# Patient Record
Sex: MALE | Race: WHITE
[De-identification: names, ages, dates, MRNs, and addresses within clinical notes are randomized per-mention and may not be internally consistent; named-entity substitution may affect disease eponyms.]

---

## 2020-10-17 ENCOUNTER — HOSPITAL ENCOUNTER (EMERGENCY)
Dept: HOSPITAL 15 - ER | Age: 25
LOS: 1 days | Discharge: ANOTHER HOSPITAL | End: 2020-10-18
Payer: COMMERCIAL

## 2020-10-17 VITALS — HEIGHT: 70 IN | BODY MASS INDEX: 26.48 KG/M2 | WEIGHT: 185 LBS

## 2020-10-17 VITALS — SYSTOLIC BLOOD PRESSURE: 127 MMHG | DIASTOLIC BLOOD PRESSURE: 87 MMHG
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
## 2020-10-17 DIAGNOSIS — H33.012: ICD-10-CM
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
## 2020-10-17 DIAGNOSIS — S05.32XA: Primary | ICD-10-CM
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
## 2020-10-17 PROCEDURE — 84443 ASSAY THYROID STIM HORMONE: CPT

## 2020-10-17 PROCEDURE — 85610 PROTHROMBIN TIME: CPT

## 2020-10-17 PROCEDURE — 83605 ASSAY OF LACTIC ACID: CPT

## 2020-10-17 PROCEDURE — 85730 THROMBOPLASTIN TIME PARTIAL: CPT

## 2020-10-17 PROCEDURE — 70480 CT ORBIT/EAR/FOSSA W/O DYE: CPT

## 2020-10-17 PROCEDURE — 36415 COLL VENOUS BLD VENIPUNCTURE: CPT

## 2020-10-17 PROCEDURE — 84484 ASSAY OF TROPONIN QUANT: CPT

## 2020-10-17 PROCEDURE — 83735 ASSAY OF MAGNESIUM: CPT

## 2020-10-17 PROCEDURE — 93005 ELECTROCARDIOGRAM TRACING: CPT

## 2020-10-17 PROCEDURE — 96374 THER/PROPH/DIAG INJ IV PUSH: CPT

## 2020-10-17 PROCEDURE — 99285 EMERGENCY DEPT VISIT HI MDM: CPT

## 2020-10-17 PROCEDURE — 80053 COMPREHEN METABOLIC PANEL: CPT

## 2020-10-17 PROCEDURE — 96361 HYDRATE IV INFUSION ADD-ON: CPT

## 2020-10-17 PROCEDURE — 96376 TX/PRO/DX INJ SAME DRUG ADON: CPT

## 2020-10-17 PROCEDURE — 96375 TX/PRO/DX INJ NEW DRUG ADDON: CPT

## 2020-10-17 PROCEDURE — 83880 ASSAY OF NATRIURETIC PEPTIDE: CPT

## 2020-10-18 LAB
ALBUMIN SERPL-MCNC: 4.1 G/DL (ref 3.4–5)
ALP SERPL-CCNC: 88 U/L (ref 45–117)
ALT SERPL-CCNC: 47 U/L (ref 16–61)
ANION GAP SERPL CALCULATED.3IONS-SCNC: 10 MMOL/L (ref 5–15)
APTT PPP: 27.6 SEC (ref 23–31.2)
BILIRUB SERPL-MCNC: 0.7 MG/DL (ref 0.2–1)
BUN SERPL-MCNC: 10 MG/DL (ref 7–18)
BUN/CREAT SERPL: 11
CALCIUM SERPL-MCNC: 8.2 MG/DL (ref 8.5–10.1)
CHLORIDE SERPL-SCNC: 109 MMOL/L (ref 98–107)
CO2 SERPL-SCNC: 22 MMOL/L (ref 21–32)
GLUCOSE SERPL-MCNC: 111 MG/DL (ref 74–106)
INR PPP: 1.02 (ref 0.9–1.15)
MAGNESIUM SERPL-MCNC: 2.2 MG/DL (ref 1.6–2.6)
POTASSIUM SERPL-SCNC: 3.5 MMOL/L (ref 3.5–5.1)
PROT SERPL-MCNC: 7.3 G/DL (ref 6.4–8.2)
SODIUM SERPL-SCNC: 141 MMOL/L (ref 136–145)

## 2021-03-14 ENCOUNTER — HOSPITAL ENCOUNTER (EMERGENCY)
Dept: HOSPITAL 15 - ER | Age: 26
Discharge: HOME | End: 2021-03-14
Payer: COMMERCIAL

## 2021-03-14 VITALS — HEIGHT: 70 IN | WEIGHT: 185 LBS | BODY MASS INDEX: 26.48 KG/M2

## 2021-03-14 VITALS — SYSTOLIC BLOOD PRESSURE: 137 MMHG | DIASTOLIC BLOOD PRESSURE: 87 MMHG

## 2021-03-14 DIAGNOSIS — S61.211A: Primary | ICD-10-CM
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
## 2021-03-14 DIAGNOSIS — F12.10: ICD-10-CM
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
## 2021-03-14 PROCEDURE — 73140 X-RAY EXAM OF FINGER(S): CPT

## 2021-03-14 PROCEDURE — 96372 THER/PROPH/DIAG INJ SC/IM: CPT

## 2021-03-14 PROCEDURE — 12002 RPR S/N/AX/GEN/TRNK2.6-7.5CM: CPT

## 2021-03-14 PROCEDURE — 99283 EMERGENCY DEPT VISIT LOW MDM: CPT

## 2024-12-16 ENCOUNTER — HOSPITAL ENCOUNTER (EMERGENCY)
Dept: HOSPITAL 15 - ER | Age: 29
LOS: 1 days | Discharge: HOME | End: 2024-12-17
Payer: MEDICAID

## 2024-12-16 VITALS — BODY MASS INDEX: 26.51 KG/M2 | WEIGHT: 185.19 LBS | HEIGHT: 70 IN
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
## 2024-12-16 DIAGNOSIS — F10.129: Primary | ICD-10-CM

## 2024-12-16 DIAGNOSIS — R45.851: ICD-10-CM

## 2024-12-16 DIAGNOSIS — F12.10: ICD-10-CM

## 2024-12-16 DIAGNOSIS — F32.9: ICD-10-CM

## 2024-12-16 PROCEDURE — 96372 THER/PROPH/DIAG INJ SC/IM: CPT

## 2024-12-16 PROCEDURE — 99285 EMERGENCY DEPT VISIT HI MDM: CPT

## 2024-12-16 RX ADMIN — HALOPERIDOL LACTATE ONE MG: 5 INJECTION, SOLUTION INTRAMUSCULAR at 21:43

## 2024-12-16 RX ADMIN — SODIUM CHLORIDE ONE MLS/HR: 0.9 INJECTION, SOLUTION INTRAVENOUS at 21:15

## 2024-12-16 RX ADMIN — LORAZEPAM ONE MG: 2 INJECTION, SOLUTION INTRAMUSCULAR; INTRAVENOUS at 21:41

## 2024-12-16 RX ADMIN — DIPHENHYDRAMINE HYDROCHLORIDE ONE MG: 50 INJECTION INTRAMUSCULAR; INTRAVENOUS at 21:42

## 2024-12-16 NOTE — ED.PDOC
Psychiatric


HPI Comments


29-year-old male with no PMHx or PSHx brought in by EMS presents with a chief 

complaint of suicidal ideation and alcohol intoxication. Patient was brought in 

by [facility] and fire department after being found sleeping in his bed with a belt

around his neck. Patient is heavily intoxicated and has slurred speech. Patient 

reports that he tried to take his life and "I don't want to be here, you guys 

should have left me in my bed". Per , patient has a DUI. Patient 

uncooperative at time of physical assessment.


Time Seen by MD:  21:00


Primary Care Provider:  NONE


Reviewed Notes:  Paramedic Notes, Medications, Allergies


Information Source:  Emergency Med Personnel


Mode of Arrival:  EMS


Severity:  Unable to Care for Self, Unable to Control Self


Severity of Pain:  None


Severity of Mental Status:  Moderate


Severity of Symptoms:  Moderate


Timing:  Minutes


Duration:  Since onset


Prehospital treatment:  None


Presents with:  Suicidal Ideation, Alcohol Intoxication


Attempt:  Hanging


Ingestion:  ETOH


Circumstance:  Found:Sleeping


Current substance abuse:  ETOH


History of:  Suicidal Attempt





Past Medical History


PAST MEDICAL HISTORY:  Denies


Surgical History:  Denies all surgeries





Family History


Family History:  Unknown





Social History


Smoker:  Non-Smoker, Other


Alcohol:  Heavy


Drugs:  Marijuana


Lives In:  Home





Constitutional:  denies: chills, diaphoresis, fatigue, fever, malaise, sweats, 

weakness, others


EENTM:  denies: blurred vision, double vision, ear bleeding, ear discharge, ear 

drainage, ear pain, ear ringing, eye pain, eye redness, hearing loss, mouth 

pain, mouth swelling, nasal discharge, nose bleeding, nose congestion, nose 

pain, photophobia, tearing, throat pain, throat swelling, voice changes, others


Respiratory:  denies: cough, hemoptysis, orthopnea, SOB at rest, shortness of 

breath, SOB with excertion, stridor, wheezing, others


Cardiovascular:  denies: chest pain, dizzy spells, diaphoresis, Dyspnea on 

exertion, edema, irregular heart beat, left arm pain, lightheadedness, 

palpitations, PND, syncope, others


Gastrointestinal:  denies: abdomen distended, abdominal pain, blood streaked 

bowels, constipated, diarrhea, dysphagia, difficulty swallowing, hematemesis, 

melena, nausea, poor appetite, poor fluid intake, rectal bleeding, rectal pain, 

vomiting, others


Genitourinary:  denies: burning, dysuria, flank pain, frequency, hematuria, 

incontinence, penile discharge, penile sore, pain, testicle pain, testicle 

swelling, urgency, others


Neurological:  denies: dizziness, fainting, headache, left sided numbness, left 

sided weakness, numbness, paresthesia, pre-existing deficit, right sided 

numbness, right sided weakness, seizure, speech problems, tingling, tremors, 

weakness, others


Musculoskeletal:  denies: back pain, gout, joint pain, joint swelling, muscle p

ain, muscle stiffness, neck pain, others


Integumetry:  denies: bruises, change in color, change in hair/nails, dryness, 

laceration, lesions, lumps, rash, wounds, others


Allergic/Immunocompromised:  denies: Difficulty Healing, Frequent Infections, 

Hives, Itching, others


Hematologic/Lymphatic:  denies: anemia, blood clots, easy bleeding, easy 

bruising, swollen glands, others


Endocrine:  denies: excessive hunger, excessive sweating, excessive thirst, 

excessive urination, flushing, intolerance to cold, intolerance to heat, 

unexplained weight gain, unexplained weight loss, others


Psychiatric:  reports: suicidal; denies: anxiety, bipolar disorder, depression, 

hopeless, panic disorder, schizophrenia, sleepless, others


All Other Systems:  Reviewed and Negative





Physical Exam


General Appearance:  No Apparent Distress, Normal, Severe Distress


HEENT:  Normal ENT Inspection, Pharynx Normal, TMs Normal


Neck:  Full Range of Motion, Non-Tender, Normal, Normal Inspection


Respiratory:  Chest Non-Tender, Lungs Clear, No Accessory Muscle Use, No 

Respiratory Distress, Normal Breath Sounds, Other (Alcohol on his breath)


Cardiovascular:  No Edema, No JVD, No Murmur, No Gallop, Normal Peripheral 

Pulses, Regular Rate/Rhythm


Breast Exam:  Deferred


Gastrointestinal:  No Organomegaly, Non Tender, No Pulsatile Mass, Normal Bowel 

Sounds, Soft


Genitalia:  Deferred


Pelvic:  Deferred


Rectal:  Deferred


Extremities:  No calf tenderness, Normal capillary refill, Normal inspection, 

Normal range of motion, Non-tender, No pedal edema


Musculoskeletal :  


   Apperance:  Normal


Neurologic:  Alert, CNs II-XII nml as Tested, No Motor Deficits, Normal Affect, 

Normal Mood, No Sensory Deficits


Cerebellar Function:  Normal


Reflexes:  Normal


Skin:  Dry, Normal Color, Warm


Lymphatic:  No Adenopathy





Was a procedure done?


Was a procedure done?:  No





Psych Differential Dx


Psych. Differential Dx:  Depression


Suicidal Differential Dx:  Substance Abuse





X-Ray, Labs, Meds, VS





                                   Vital Signs








  Date Time  Temp Pulse Resp B/P (MAP) Pulse Ox O2 Delivery O2 Flow Rate FiO2


 


12/17/24 08:17 97.6 56 12 102/56 (71)    





 97.6       


 


12/17/24 08:17  56 12   Room Air* 0 21 12/17/24 03:32  111 17  98 Room Air* 0 21 12/17/24 03:30  69 16 105/54 (71) 92   


 


12/17/24 02:30  70 16 105/57 (73) 92   


 


12/17/24 01:30  71 15 106/52 (70) 92   


 


12/17/24 00:30  60 13 108/57 (74) 92   


 


12/16/24 23:30  64 13 104/51 (68) 92   


 


12/16/24 22:30  79 15 131/74 (93) 92   


 


12/16/24 21:55 97.9 85 14 138/79 (98) 92   





 97.9       


 


12/16/24 21:07  103 16 144/82 (102) 99   








                               Current Medications








 Medications


  (Trade)  Dose


 Ordered  Sig/Margot


 Route  Start Time


 Stop Time Status Last Admin


 


 Haloperidol


 Lactate


  (Haldol)  10 mg  ONCE  ONCE


 IM  12/16/24 21:15


 12/16/24 21:16 DC 12/16/24 21:43





 


 Lorazepam


  (Ativan Inj)  2 mg  ONCE  ONCE


 IM  12/16/24 21:15


 12/16/24 21:16 DC 12/16/24 21:41





 


 Diphenhydramine


 HCl


  (Benadryl


 Injection)  50 mg  ONCE  ONCE


 IM  12/16/24 21:15


 12/16/24 21:16 DC 12/16/24 21:42








The patient is extremely and severely agitated and combative.  Unwilling to 

cooperated and had to be sedated with Haldol Ativan and Benadryl.


Time of 1ST Reevaluation:  21:30


Reevaluation 1ST:  Unchanged


Patient Education/Counseling:  Diagnosis, Treatment, Prognosis


Family Education/Counseling:  No Family Present





Departure 1


Departure


Time of Disposition:  09:49 (Patient was cleared for discharge by psychiatry.  

Patient be discharged home with outpatient follow up)


Impression:  


   Primary Impression:  


   Cannabis abuse


   Additional Impressions:  


   Alcohol intoxication


   Qualified Codes:  F10.929 - Alcohol use, unspecified with intoxication, 

   unspecified


   Suicide ideation


   Major depression


   Qualified Codes:  F32.3 - Major depressive disorder, single episode, severe 

   with psychotic features


Disposition:  01 HOME / SELF CARE / HOMELESS


Condition:  Stable





Additional Instructions:  


Your workup today was benign.


You can take Tylenol or Motrin as needed for pain.


You should follow up with your regular doctor within 1 week.


You should stay well rested and well hydrated.


If your symptoms worsen or you have any other concerns please return to the 

emergency room.


Discharged With:  Self





Critical Care Note


Critical Care Time?:  No





Stability


Stability form required:  No








I personally scribed for CASSIE PARRA MD ([email or web address]) on 12/16/24 at 21:07.  

Electronically submitted by Alex Tong ([email or web address]).





CASSIE PARRA MD             Dec 16, 2024 21:07


FELI RAMOS MD               Dec 17, 2024 09:49

## 2024-12-17 VITALS — HEART RATE: 111 BPM | RESPIRATION RATE: 17 BRPM | OXYGEN SATURATION: 98 %

## 2024-12-17 VITALS
RESPIRATION RATE: 12 BRPM | HEART RATE: 56 BPM | SYSTOLIC BLOOD PRESSURE: 102 MMHG | DIASTOLIC BLOOD PRESSURE: 56 MMHG | TEMPERATURE: 97.6 F

## 2024-12-17 NOTE — DVHINCON2
Date of Service if different f:  Dec 17, 2024


Time of Service:  09:07





Consultation ([facility])


Consulting Physician:  IVAN DISLA MD


Appearance:  Stated age


Psychomotor activity:  WNL, Calm


Behavioral:  Cooperative


Eye contact:  Appropriate


Speech:  WNL


Affect:  Appropriate, Mood Congruent


Mood:  Depressed


Thought processes:  Linear/Goal-directed


Thought content:  WNL


Suicidal ideations:  Absent


Homicidal ideations:  Absent


Orientation:  Person, Place, Time, Situation


Memory intact:  Recent


Intellect:  Average


Abstractability:  WNL


Concentration:  Adequate


Attention:  Adequate


Judgement:  WNL


Insight:  Good


Vitals





Vital Signs








  Date Time  Temp Pulse Resp B/P (MAP) Pulse Ox O2 Delivery O2 Flow Rate FiO2


 


12/17/24 08:17 97.6 56 12 102/56 (71)    





 97.6       


 


12/17/24 08:17      Room Air* 0 21


 


12/17/24 03:32     98   








Treatment plan discussed:  With staff


Medication adjusted:  No


Labs ordered:  No


Psychotherapy provided:  No


Type:  Voluntary


History of Present Illness


Reason for Consult : psychiatric evaluation





PER ED PHYSICIAN: 29-year-old male with no PMHx or PSHx brought in by EMS 

presents with a chief complaint of suicidal ideation and alcohol intoxication. 

Patient was brought in by [facility] and fire department after being found sleeping

in his bed with a belt around his neck. Patient is heavily intoxicated and has 

slurred speech. Patient reports that he tried to take his life and "I don't want

to be here, you guys should have left me in my bed". Per [facility], patient has a 

DUI. Patient uncooperative at time of physical assessment.





PSYCHIATRIST HPI:   The patient was seen and evaluated at [facility] ED via 

telepsychiatry platform. 29 yr old male BIBA with suicidal ideation and alcohol 

intoxication. He was found with a belt around his neck asleep. He reported he 

was highly intoxicated yesterday. He got upset about work and started drinking 

alcohol and figures he had at least six double shots. His friend found him when 

he was sleeping. He denied having any suicidal ideation, plan or intent.  He 

reported he plans to go to alcoholic anonymous and is motivated to stop drinking

so he can be there for his child. He noted he was upset due to getting A DUI on 

his way home for work so went home and fell asleep. He has a past DUI 12 years 

ago so is aware of the legal stress and requirements around it as far as 

substance use classes and sobriety. He is concerned that he may lose his job, 

but denied having suicidal or homicidal ideation and denied having auditory and 

visual hallucinations.





Past Psychiatric History :  No past hospitalization, treatment or suicide a

ttempts.





Past Medical History: hypertension





Current Medications:  losartan


NKDA





Substance use:    Alcohol- sometimes drinks a lot but sometimes goes without for

a month or two. Received DUI yesterday on his way home from work. Had DUI 12 

years. Denied other substance use. 





Social History : Lives in [place] with his parents (rents a room from a 

friend). Never . One 7 yr old son who lives with his mother and he has 

regular contact with.  Works as .





Diagnosis:  ALCOHOL USE DISORDER, MODERATE





Formulation: This 29 yr old male appears to suffer from alcohol use disorder and

was stopped for a DUI and frustrated last night when he went home intoxicated 

and fell asleep. He reported multiple reasons for living and a plan to start AA 

and work on sobriety. He is no longer suicidal and does not warrant psychiatric 

hospitalization.


He may benefit from starting AA and getting into substance use treatment.





Plan: 1. Safety. The patient is a low risk for self-harm and may be managed as 

an outpatient.





2. Legal-voluntary





3. Medication: no medications are indicated at this time. In the future, he may 

consider gabapentin 100-300mg TID to help reduce cravings, Naltrexone 50mg qam 

to help reduce heavy drinking days.





4.  Contact psychiatry if further follow up or reevaluation is desired. Follow 

up with outpatient mental health for medication management and therapy.





5.  Case discussed with ED Physician, Abraham Rosa.


Assessment/Diagnosis/Plan


Reviewed:  Labs, Medications, Previous Orders











IVAN DISLA MD              Dec 17, 2024 09:08